# Patient Record
Sex: MALE | Race: BLACK OR AFRICAN AMERICAN | NOT HISPANIC OR LATINO | Employment: OTHER | URBAN - METROPOLITAN AREA
[De-identification: names, ages, dates, MRNs, and addresses within clinical notes are randomized per-mention and may not be internally consistent; named-entity substitution may affect disease eponyms.]

---

## 2024-03-11 ENCOUNTER — OFFICE VISIT (OUTPATIENT)
Dept: PODIATRY | Facility: CLINIC | Age: 74
End: 2024-03-11
Payer: MEDICARE

## 2024-03-11 ENCOUNTER — HOSPITAL ENCOUNTER (OUTPATIENT)
Dept: RADIOLOGY | Facility: HOSPITAL | Age: 74
Discharge: HOME | End: 2024-03-11
Attending: PODIATRIST
Payer: MEDICARE

## 2024-03-11 VITALS — WEIGHT: 240.31 LBS | BODY MASS INDEX: 35.59 KG/M2 | HEIGHT: 69 IN

## 2024-03-11 DIAGNOSIS — G57.91 PERIPHERAL NEURITIS OF RIGHT FOOT: ICD-10-CM

## 2024-03-11 DIAGNOSIS — G57.91 PERIPHERAL NEURITIS OF RIGHT FOOT: Primary | ICD-10-CM

## 2024-03-11 PROCEDURE — 76882 US LMTD JT/FCL EVL NVASC XTR: CPT | Mod: 26,RT,, | Performed by: RADIOLOGY

## 2024-03-11 PROCEDURE — 3288F FALL RISK ASSESSMENT DOCD: CPT | Mod: CPTII,S$GLB,, | Performed by: PODIATRIST

## 2024-03-11 PROCEDURE — 1101F PT FALLS ASSESS-DOCD LE1/YR: CPT | Mod: CPTII,S$GLB,, | Performed by: PODIATRIST

## 2024-03-11 PROCEDURE — 99999 PR PBB SHADOW E&M-NEW PATIENT-LVL II: CPT | Mod: PBBFAC,,, | Performed by: PODIATRIST

## 2024-03-11 PROCEDURE — 1159F MED LIST DOCD IN RCRD: CPT | Mod: CPTII,S$GLB,, | Performed by: PODIATRIST

## 2024-03-11 PROCEDURE — 3044F HG A1C LEVEL LT 7.0%: CPT | Mod: CPTII,S$GLB,, | Performed by: PODIATRIST

## 2024-03-11 PROCEDURE — 3008F BODY MASS INDEX DOCD: CPT | Mod: CPTII,S$GLB,, | Performed by: PODIATRIST

## 2024-03-11 PROCEDURE — 73630 X-RAY EXAM OF FOOT: CPT | Mod: TC,PO,RT

## 2024-03-11 PROCEDURE — 99204 OFFICE O/P NEW MOD 45 MIN: CPT | Mod: S$GLB,,, | Performed by: PODIATRIST

## 2024-03-11 PROCEDURE — 73630 X-RAY EXAM OF FOOT: CPT | Mod: 26,RT,, | Performed by: RADIOLOGY

## 2024-03-11 PROCEDURE — 1125F AMNT PAIN NOTED PAIN PRSNT: CPT | Mod: CPTII,S$GLB,, | Performed by: PODIATRIST

## 2024-03-11 PROCEDURE — 76882 US LMTD JT/FCL EVL NVASC XTR: CPT | Mod: TC,PO,RT

## 2024-03-11 PROCEDURE — 1160F RVW MEDS BY RX/DR IN RCRD: CPT | Mod: CPTII,S$GLB,, | Performed by: PODIATRIST

## 2024-03-11 RX ORDER — GABAPENTIN 300 MG/1
300 CAPSULE ORAL NIGHTLY
Qty: 30 CAPSULE | Refills: 1 | Status: SHIPPED | OUTPATIENT
Start: 2024-03-11 | End: 2024-04-12 | Stop reason: SDUPTHER

## 2024-03-11 NOTE — PROGRESS NOTES
Subjective:      Patient ID: Alexei Michael is a 73 y.o. male.    Chief Complaint: Diabetes Mellitus (Something bit him on top of right foot, knot in foot, dorsal leg pain/Right foot coldness, numbness across top of foot sometimes)    Alexei is a 73 y.o. male who presents to the podiatry clinic  with complaint of  right foot pain top of the foot and along the back of the calf. Onset of the symptoms was several months ago. Precipitating event:  went outside one night to check on his garden and something bit him on the top of the foot, he stomped his foot down and caused pain all of the back of the leg as well . Current symptoms include: pain, cold feeling burning felt mostly when off his feet at night, does not bother him when walking or driving. Symptoms have waxed and waned. Patient has had no prior foot problems. Evaluation to date: none. Treatment to date: none. Patients rates pain 5/10 on pain scale.    Review of Systems   Constitutional: Negative for chills and fever.   Cardiovascular:  Negative for claudication and leg swelling.   Respiratory:  Negative for shortness of breath.    Skin:  Negative for itching, nail changes and rash.   Musculoskeletal:  Negative for muscle cramps, muscle weakness and myalgias.   Gastrointestinal:  Negative for nausea and vomiting.   Neurological:  Positive for paresthesias. Negative for focal weakness, loss of balance and numbness.           Objective:      Physical Exam  Constitutional:       General: He is not in acute distress.     Appearance: He is well-developed. He is not diaphoretic.   Cardiovascular:      Pulses:           Dorsalis pedis pulses are 2+ on the right side and 2+ on the left side.        Posterior tibial pulses are 2+ on the right side and 2+ on the left side.      Comments: < 3 sec capillary refill time to toes 1-5 bilateral. Toes and feet are warm to touch proximally with normal distal cooling b/l. There is some hair growth on the feet and toes b/l.  There is no edema b/l. No spider veins or varicosities present b/l.     Musculoskeletal:      Comments: Equinus noted b/l ankles with < 10 deg DF noted. MMT 5/5 in DF/PF/Inv/Ev resistance with no reproduction of pain in any direction. Passive range of motion of ankle and pedal joints is painless b/l.    Right foot there is some scarring noted over the area he believes he was bit. Some paresthesias noted overlying the deep peroneal nerve dorsal right foot, some tenderness noted to the calf muscle posterior right leg.    Skin:     General: Skin is warm and dry.      Coloration: Skin is not pale.      Findings: No abrasion, bruising, burn, ecchymosis, erythema, laceration, lesion, petechiae or rash.      Nails: There is no clubbing.      Comments: Skin temperature, texture and turgor within normal limits.   Neurological:      Mental Status: He is alert and oriented to person, place, and time.      Sensory: No sensory deficit.      Motor: No tremor, atrophy or abnormal muscle tone.      Comments: Negative tinel sign bilateral.   Psychiatric:         Behavior: Behavior normal.               Assessment:       Encounter Diagnosis   Name Primary?    Peripheral neuritis of right foot Yes         Plan:       Alexei was seen today for diabetes mellitus.    Diagnoses and all orders for this visit:    Peripheral neuritis of right foot  -     gabapentin (NEURONTIN) 300 MG capsule; Take 1 capsule (300 mg total) by mouth every evening.  -     X-Ray Foot Complete Right; Future  -     US Extremity Non Vascular Limited Right; Future      I counseled the patient on his conditions, their implications and medical management.    I believe most of the pain he is feeling is neuropathy pain, as it bothers him most at night at rest I will start him on 300 mg gabapentin nightly    Will get x-ray and US to rule out foreign body with scarring to the dorsal foot where he believes he was bot    Return in 1 month for follow up    Surinder Mejias  [facility]

## 2024-04-12 ENCOUNTER — OFFICE VISIT (OUTPATIENT)
Dept: PODIATRY | Facility: CLINIC | Age: 74
End: 2024-04-12
Payer: MEDICARE

## 2024-04-12 VITALS — WEIGHT: 240.31 LBS | HEIGHT: 69 IN | BODY MASS INDEX: 35.59 KG/M2

## 2024-04-12 DIAGNOSIS — G57.91 PERIPHERAL NEURITIS OF RIGHT FOOT: Primary | ICD-10-CM

## 2024-04-12 DIAGNOSIS — R09.89 DECREASED PEDAL PULSES: ICD-10-CM

## 2024-04-12 DIAGNOSIS — I73.9 CLAUDICATION OF RIGHT LOWER EXTREMITY: ICD-10-CM

## 2024-04-12 PROCEDURE — 3008F BODY MASS INDEX DOCD: CPT | Mod: CPTII,S$GLB,, | Performed by: PODIATRIST

## 2024-04-12 PROCEDURE — 3044F HG A1C LEVEL LT 7.0%: CPT | Mod: CPTII,S$GLB,, | Performed by: PODIATRIST

## 2024-04-12 PROCEDURE — 1160F RVW MEDS BY RX/DR IN RCRD: CPT | Mod: CPTII,S$GLB,, | Performed by: PODIATRIST

## 2024-04-12 PROCEDURE — 1101F PT FALLS ASSESS-DOCD LE1/YR: CPT | Mod: CPTII,S$GLB,, | Performed by: PODIATRIST

## 2024-04-12 PROCEDURE — 99999 PR PBB SHADOW E&M-EST. PATIENT-LVL II: CPT | Mod: PBBFAC,,, | Performed by: PODIATRIST

## 2024-04-12 PROCEDURE — 1126F AMNT PAIN NOTED NONE PRSNT: CPT | Mod: CPTII,S$GLB,, | Performed by: PODIATRIST

## 2024-04-12 PROCEDURE — 4010F ACE/ARB THERAPY RXD/TAKEN: CPT | Mod: CPTII,S$GLB,, | Performed by: PODIATRIST

## 2024-04-12 PROCEDURE — 99214 OFFICE O/P EST MOD 30 MIN: CPT | Mod: S$GLB,,, | Performed by: PODIATRIST

## 2024-04-12 PROCEDURE — 3288F FALL RISK ASSESSMENT DOCD: CPT | Mod: CPTII,S$GLB,, | Performed by: PODIATRIST

## 2024-04-12 PROCEDURE — 1159F MED LIST DOCD IN RCRD: CPT | Mod: CPTII,S$GLB,, | Performed by: PODIATRIST

## 2024-04-12 RX ORDER — GABAPENTIN 300 MG/1
300 CAPSULE ORAL NIGHTLY
Qty: 90 CAPSULE | Refills: 3 | Status: SHIPPED | OUTPATIENT
Start: 2024-04-12 | End: 2025-04-07

## 2024-04-12 NOTE — PROGRESS NOTES
Subjective:      Patient ID: Alexei Michael is a 73 y.o. male.    Chief Complaint: Peripheral neuritis of right foot    Alexei is a 73 y.o. male who presents to the podiatry clinic  with complaint of  right foot pain top of the foot and along the back of the calf. Onset of the symptoms was several months ago. Precipitating event:  went outside one night to check on his garden and something bit him on the top of the foot, he stomped his foot down and caused pain all of the back of the leg as well . Current symptoms include: pain, cold feeling burning felt mostly when off his feet at night, does not bother him when walking or driving. Symptoms have waxed and waned. Patient has had no prior foot problems. Evaluation to date: none. Treatment to date: none. Patients rates pain 5/10 on pain scale.    4/12/24: Patient returns for right foot pain, relates the sharp pains he was feeling have calmed with the gabapentin he started taking, but still feels cold toes on the right foot. He also has calf muscle pain and cramping after walking about a block that calms with rest    Review of Systems   Constitutional: Negative for chills and fever.   Cardiovascular:  Negative for claudication and leg swelling.   Respiratory:  Negative for shortness of breath.    Skin:  Negative for itching, nail changes and rash.   Musculoskeletal:  Negative for muscle cramps, muscle weakness and myalgias.   Gastrointestinal:  Negative for nausea and vomiting.   Neurological:  Positive for paresthesias. Negative for focal weakness, loss of balance and numbness.           Objective:      Physical Exam  Constitutional:       General: He is not in acute distress.     Appearance: He is well-developed. He is not diaphoretic.   Cardiovascular:      Pulses:           Dorsalis pedis pulses are 1+ on the right side and 1+ on the left side.        Posterior tibial pulses are 1+ on the right side and 1+ on the left side.      Comments: < 3 sec capillary  refill time to toes 1-5 bilateral. Toes and feet are warm to touch proximally with normal distal cooling b/l. There is some hair growth on the feet and toes b/l. There is no edema b/l. No spider veins or varicosities present b/l.     Musculoskeletal:      Comments: Equinus noted b/l ankles with < 10 deg DF noted. MMT 5/5 in DF/PF/Inv/Ev resistance with no reproduction of pain in any direction. Passive range of motion of ankle and pedal joints is painless b/l.    Right foot there is some scarring noted over the area he believes he was bit. Some paresthesias noted overlying the deep peroneal nerve dorsal right foot, some tenderness noted to the calf muscle posterior right leg.    Skin:     General: Skin is warm and dry.      Coloration: Skin is not pale.      Findings: No abrasion, bruising, burn, ecchymosis, erythema, laceration, lesion, petechiae or rash.      Nails: There is no clubbing.      Comments: Skin temperature, texture and turgor within normal limits.   Neurological:      Mental Status: He is alert and oriented to person, place, and time.      Sensory: No sensory deficit.      Motor: No tremor, atrophy or abnormal muscle tone.      Comments: Negative tinel sign bilateral.   Psychiatric:         Behavior: Behavior normal.               Assessment:       Encounter Diagnoses   Name Primary?    Peripheral neuritis of right foot Yes    Claudication of right lower extremity     Decreased pedal pulses            Plan:       Alexei was seen today for peripheral neuritis of right foot.    Diagnoses and all orders for this visit:    Peripheral neuritis of right foot  -     gabapentin (NEURONTIN) 300 MG capsule; Take 1 capsule (300 mg total) by mouth every evening.    Claudication of right lower extremity  -     US Lower Extremity Arteries Bilateral; Future    Decreased pedal pulses  -     US Lower Extremity Arteries Bilateral; Future        I counseled the patient on his conditions, their implications and medical  management.    I believe most of the pain he is feeling is neuropathy pain, however with his calf pain with walking a block and feeling like he has cold toes I want to order an ART US to rule out blockages and PAOD    Return in 1 month for follow up    Surinder Mejias DPM

## 2024-05-13 ENCOUNTER — OFFICE VISIT (OUTPATIENT)
Dept: PODIATRY | Facility: CLINIC | Age: 74
End: 2024-05-13
Payer: MEDICARE

## 2024-05-13 VITALS — BODY MASS INDEX: 35.59 KG/M2 | HEIGHT: 69 IN | WEIGHT: 240.31 LBS

## 2024-05-13 DIAGNOSIS — I73.9 CLAUDICATION OF RIGHT LOWER EXTREMITY: ICD-10-CM

## 2024-05-13 DIAGNOSIS — G57.91 PERIPHERAL NEURITIS OF RIGHT FOOT: Primary | ICD-10-CM

## 2024-05-13 PROCEDURE — 3008F BODY MASS INDEX DOCD: CPT | Mod: CPTII,S$GLB,, | Performed by: PODIATRIST

## 2024-05-13 PROCEDURE — 1160F RVW MEDS BY RX/DR IN RCRD: CPT | Mod: CPTII,S$GLB,, | Performed by: PODIATRIST

## 2024-05-13 PROCEDURE — 1126F AMNT PAIN NOTED NONE PRSNT: CPT | Mod: CPTII,S$GLB,, | Performed by: PODIATRIST

## 2024-05-13 PROCEDURE — 99213 OFFICE O/P EST LOW 20 MIN: CPT | Mod: S$GLB,,, | Performed by: PODIATRIST

## 2024-05-13 PROCEDURE — 4010F ACE/ARB THERAPY RXD/TAKEN: CPT | Mod: CPTII,S$GLB,, | Performed by: PODIATRIST

## 2024-05-13 PROCEDURE — 99999 PR PBB SHADOW E&M-EST. PATIENT-LVL II: CPT | Mod: PBBFAC,,, | Performed by: PODIATRIST

## 2024-05-13 PROCEDURE — 1159F MED LIST DOCD IN RCRD: CPT | Mod: CPTII,S$GLB,, | Performed by: PODIATRIST

## 2024-05-13 PROCEDURE — 3044F HG A1C LEVEL LT 7.0%: CPT | Mod: CPTII,S$GLB,, | Performed by: PODIATRIST

## 2024-05-13 NOTE — PROGRESS NOTES
Subjective:      Patient ID: Alexei Michael is a 73 y.o. male.    Chief Complaint: Peripheral neuritis of right foot    Alexei is a 73 y.o. male who presents to the podiatry clinic  with complaint of  right foot pain top of the foot and along the back of the calf. Onset of the symptoms was several months ago. Precipitating event:  went outside one night to check on his garden and something bit him on the top of the foot, he stomped his foot down and caused pain all of the back of the leg as well . Current symptoms include: pain, cold feeling burning felt mostly when off his feet at night, does not bother him when walking or driving. Symptoms have waxed and waned. Patient has had no prior foot problems. Evaluation to date: none. Treatment to date: none. Patients rates pain 5/10 on pain scale.    4/12/24: Patient returns for right foot pain, relates the sharp pains he was feeling have calmed with the gabapentin he started taking, but still feels cold toes on the right foot. He also has calf muscle pain and cramping after walking about a block that calms with rest    5/13/24: Patient returns for right foot and leg pain improving on the gabapentin just feels the toes are cold at times still    Review of Systems   Constitutional: Negative for chills and fever.   Cardiovascular:  Negative for claudication and leg swelling.   Respiratory:  Negative for shortness of breath.    Skin:  Negative for itching, nail changes and rash.   Musculoskeletal:  Negative for muscle cramps, muscle weakness and myalgias.   Gastrointestinal:  Negative for nausea and vomiting.   Neurological:  Positive for paresthesias. Negative for focal weakness, loss of balance and numbness.           Objective:      Physical Exam  Constitutional:       General: He is not in acute distress.     Appearance: He is well-developed. He is not diaphoretic.   Cardiovascular:      Pulses:           Dorsalis pedis pulses are 1+ on the right side and 1+ on  "the left side.        Posterior tibial pulses are 1+ on the right side and 1+ on the left side.      Comments: < 3 sec capillary refill time to toes 1-5 bilateral. Toes and feet are warm to touch proximally with normal distal cooling b/l. There is some hair growth on the feet and toes b/l. There is no edema b/l. No spider veins or varicosities present b/l.     Musculoskeletal:      Comments: Equinus noted b/l ankles with < 10 deg DF noted. MMT 5/5 in DF/PF/Inv/Ev resistance with no reproduction of pain in any direction. Passive range of motion of ankle and pedal joints is painless b/l.    Right foot there is some scarring noted over the area he believes he was bit. Some paresthesias noted overlying the deep peroneal nerve dorsal right foot, some tenderness noted to the calf muscle posterior right leg.    Skin:     General: Skin is warm and dry.      Coloration: Skin is not pale.      Findings: No abrasion, bruising, burn, ecchymosis, erythema, laceration, lesion, petechiae or rash.      Nails: There is no clubbing.      Comments: Skin temperature, texture and turgor within normal limits.   Neurological:      Mental Status: He is alert and oriented to person, place, and time.      Sensory: No sensory deficit.      Motor: No tremor, atrophy or abnormal muscle tone.      Comments: Negative tinel sign bilateral.   Psychiatric:         Behavior: Behavior normal.               Assessment:       Encounter Diagnoses   Name Primary?    Peripheral neuritis of right foot Yes    Claudication of right lower extremity              Plan:       Alexei "Country" was seen today for peripheral neuritis of right foot.    Diagnoses and all orders for this visit:    Peripheral neuritis of right foot    Claudication of right lower extremity          I counseled the patient on his conditions, their implications and medical management.    Art US shows dampened waveforms but no significant stenosis    Take the gabapentin daily as it seems " to be helping    Return PRN    Surinder Mejias DPM

## 2025-04-29 DIAGNOSIS — G57.91 PERIPHERAL NEURITIS OF RIGHT FOOT: ICD-10-CM

## 2025-04-30 RX ORDER — GABAPENTIN 300 MG/1
300 CAPSULE ORAL NIGHTLY
Qty: 90 CAPSULE | Refills: 3 | Status: SHIPPED | OUTPATIENT
Start: 2025-04-30